# Patient Record
Sex: FEMALE | Race: WHITE | NOT HISPANIC OR LATINO | Employment: STUDENT | URBAN - NONMETROPOLITAN AREA
[De-identification: names, ages, dates, MRNs, and addresses within clinical notes are randomized per-mention and may not be internally consistent; named-entity substitution may affect disease eponyms.]

---
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
## 2023-07-05 ENCOUNTER — OFFICE VISIT (OUTPATIENT)
Dept: FAMILY MEDICINE CLINIC | Facility: CLINIC | Age: 18
End: 2023-07-05
Payer: COMMERCIAL

## 2023-07-05 VITALS
BODY MASS INDEX: 25.24 KG/M2 | RESPIRATION RATE: 18 BRPM | OXYGEN SATURATION: 99 % | TEMPERATURE: 98.7 F | HEART RATE: 76 BPM | DIASTOLIC BLOOD PRESSURE: 64 MMHG | HEIGHT: 57 IN | WEIGHT: 117 LBS | SYSTOLIC BLOOD PRESSURE: 119 MMHG

## 2023-07-05 DIAGNOSIS — F41.1 GAD (GENERALIZED ANXIETY DISORDER): ICD-10-CM

## 2023-07-05 DIAGNOSIS — G43.009 MIGRAINE WITHOUT AURA AND WITHOUT STATUS MIGRAINOSUS, NOT INTRACTABLE: ICD-10-CM

## 2023-07-05 DIAGNOSIS — E55.9 VITAMIN D DEFICIENCY: ICD-10-CM

## 2023-07-05 DIAGNOSIS — T75.3XXS MOTION SICKNESS, SEQUELA: ICD-10-CM

## 2023-07-05 DIAGNOSIS — Z13.29 THYROID DISORDER SCREEN: ICD-10-CM

## 2023-07-05 DIAGNOSIS — F41.1 GAD (GENERALIZED ANXIETY DISORDER): Primary | ICD-10-CM

## 2023-07-05 PROBLEM — T75.3XXA MOTION SICKNESS: Status: ACTIVE | Noted: 2023-07-05

## 2023-07-05 LAB
25(OH)D3 SERPL-MCNC: 29.4 NG/ML (ref 30–100)
ALBUMIN SERPL BCP-MCNC: 3.7 G/DL (ref 3.5–5)
ALP SERPL-CCNC: 76 U/L (ref 46–384)
ALT SERPL W P-5'-P-CCNC: 15 U/L (ref 12–78)
ANION GAP SERPL CALCULATED.3IONS-SCNC: 6 MMOL/L
AST SERPL W P-5'-P-CCNC: 13 U/L (ref 5–45)
BASOPHILS # BLD AUTO: 0.04 THOUSANDS/ÂΜL (ref 0–0.1)
BASOPHILS NFR BLD AUTO: 1 % (ref 0–1)
BILIRUB SERPL-MCNC: 0.43 MG/DL (ref 0.2–1)
BUN SERPL-MCNC: 9 MG/DL (ref 5–25)
CALCIUM SERPL-MCNC: 9.1 MG/DL (ref 8.3–10.1)
CHLORIDE SERPL-SCNC: 110 MMOL/L (ref 96–108)
CO2 SERPL-SCNC: 24 MMOL/L (ref 21–32)
CREAT SERPL-MCNC: 0.8 MG/DL (ref 0.6–1.3)
EOSINOPHIL # BLD AUTO: 0.12 THOUSAND/ÂΜL (ref 0–0.61)
EOSINOPHIL NFR BLD AUTO: 2 % (ref 0–6)
ERYTHROCYTE [DISTWIDTH] IN BLOOD BY AUTOMATED COUNT: 13.2 % (ref 11.6–15.1)
GFR SERPL CREATININE-BSD FRML MDRD: 107 ML/MIN/1.73SQ M
GLUCOSE SERPL-MCNC: 73 MG/DL (ref 65–140)
HCT VFR BLD AUTO: 42.6 % (ref 34.8–46.1)
HGB BLD-MCNC: 13.5 G/DL (ref 11.5–15.4)
IMM GRANULOCYTES # BLD AUTO: 0.01 THOUSAND/UL (ref 0–0.2)
IMM GRANULOCYTES NFR BLD AUTO: 0 % (ref 0–2)
LYMPHOCYTES # BLD AUTO: 1.9 THOUSANDS/ÂΜL (ref 0.6–4.47)
LYMPHOCYTES NFR BLD AUTO: 27 % (ref 14–44)
MCH RBC QN AUTO: 27.8 PG (ref 26.8–34.3)
MCHC RBC AUTO-ENTMCNC: 31.7 G/DL (ref 31.4–37.4)
MCV RBC AUTO: 88 FL (ref 82–98)
MONOCYTES # BLD AUTO: 0.78 THOUSAND/ÂΜL (ref 0.17–1.22)
MONOCYTES NFR BLD AUTO: 11 % (ref 4–12)
NEUTROPHILS # BLD AUTO: 4.11 THOUSANDS/ÂΜL (ref 1.85–7.62)
NEUTS SEG NFR BLD AUTO: 59 % (ref 43–75)
NRBC BLD AUTO-RTO: 0 /100 WBCS
PLATELET # BLD AUTO: 301 THOUSANDS/UL (ref 149–390)
PMV BLD AUTO: 11.9 FL (ref 8.9–12.7)
POTASSIUM SERPL-SCNC: 3.9 MMOL/L (ref 3.5–5.3)
PROT SERPL-MCNC: 7.2 G/DL (ref 6.4–8.4)
RBC # BLD AUTO: 4.85 MILLION/UL (ref 3.81–5.12)
SODIUM SERPL-SCNC: 140 MMOL/L (ref 135–147)
TSH SERPL DL<=0.05 MIU/L-ACNC: 0.61 UIU/ML (ref 0.45–4.5)
WBC # BLD AUTO: 6.96 THOUSAND/UL (ref 4.31–10.16)

## 2023-07-05 PROCEDURE — 85025 COMPLETE CBC W/AUTO DIFF WBC: CPT | Performed by: NURSE PRACTITIONER

## 2023-07-05 PROCEDURE — 82306 VITAMIN D 25 HYDROXY: CPT | Performed by: NURSE PRACTITIONER

## 2023-07-05 PROCEDURE — 80053 COMPREHEN METABOLIC PANEL: CPT | Performed by: NURSE PRACTITIONER

## 2023-07-05 PROCEDURE — T1015 CLINIC SERVICE: HCPCS | Performed by: FAMILY MEDICINE

## 2023-07-05 PROCEDURE — 84443 ASSAY THYROID STIM HORMONE: CPT | Performed by: NURSE PRACTITIONER

## 2023-07-05 RX ORDER — SERTRALINE HYDROCHLORIDE 25 MG/1
25 TABLET, FILM COATED ORAL DAILY
Qty: 90 TABLET | Refills: 1 | Status: SHIPPED | OUTPATIENT
Start: 2023-07-05

## 2023-07-05 RX ORDER — SUMATRIPTAN 25 MG/1
25 TABLET, FILM COATED ORAL ONCE AS NEEDED
Qty: 10 TABLET | Refills: 0 | Status: SHIPPED | OUTPATIENT
Start: 2023-07-05

## 2023-07-05 RX ORDER — MECLIZINE HYDROCHLORIDE 25 MG/1
25 TABLET ORAL EVERY 12 HOURS PRN
Qty: 30 TABLET | Refills: 0 | Status: SHIPPED | OUTPATIENT
Start: 2023-07-05

## 2023-07-05 NOTE — PROGRESS NOTES
Assessment/Plan:     Diagnoses and all orders for this visit:    RIMA (generalized anxiety disorder)  Comments:  RIMA-7 is 11 down from 20  Continue sertraline 25 mg daily  Declined  therapy  RTC 2 months  Orders:  -     sertraline (Zoloft) 25 mg tablet; Take 1 tablet (25 mg total) by mouth daily    Migraine without aura and without status migrainosus, not intractable  Comments:  Monthly; independent of menses  Start imitrex as needed  RTC if they occur more frequently  Orders:  -     SUMAtriptan (Imitrex) 25 mg tablet; Take 1 tablet (25 mg total) by mouth once as needed for migraine    Motion sickness, sequela  Comments:  Meclizine 1 hour prior to travel  RTC if no improvement  Orders:  -     meclizine (ANTIVERT) 25 mg tablet; Take 1 tablet (25 mg total) by mouth every 12 (twelve) hours as needed for dizziness or nausea            Return in about 4 weeks (around 8/2/2023) for Annual physical.       Subjective:        Patient ID: Amanda Horan is a 21 y.o. female. Chief Complaint   Patient presents with   • Follow-up     Anxiety med       PMH reflux and anxiety presents for anxiety follow up. Reports improvement in anxiety with sertraline use. Endorses family drama but does not want to pursue [id number] therapy at this time. Denies SI or HI. Is going to start pursuing permit for 's license. Also endorses motion sickness on longer car rides and migraine headaches without aura that occur about once a month. They do not correlate to her menses. Ibuprofen and acetaminophen have not been completely effective. Anxiety  Presents for follow-up visit. Symptoms include dizziness, excessive worry (rare) and nervous/anxious behavior (rare).  Patient reports no chest pain, compulsions, confusion, decreased concentration, depressed mood (slightly less), dry mouth, feeling of choking, hyperventilation, impotence, insomnia, irritability, malaise, muscle tension, nausea, obsessions, palpitations, panic, restlessness, shortness of breath or suicidal ideas. Symptoms occur rarely. The severity of symptoms is moderate. The patient sleeps 8 hours per night. The quality of sleep is good. Nighttime awakenings: occasional.     Compliance with medications is %. The following portions of the patient's history were reviewed and updated as appropriate: allergies, current medications, past family history, past medical history, past social history, past surgical history and problem list.    Patient Active Problem List   Diagnosis   • RIMA (generalized anxiety disorder)   • Motion sickness   • Migraine without aura and without status migrainosus, not intractable       Current Outpatient Medications   Medication Sig Dispense Refill   • levonorgestrel-ethinyl estradiol (Vienva) 0.1-20 MG-MCG per tablet Take 1 tablet by mouth daily     • meclizine (ANTIVERT) 25 mg tablet Take 1 tablet (25 mg total) by mouth every 12 (twelve) hours as needed for dizziness or nausea 30 tablet 0   • sertraline (Zoloft) 25 mg tablet Take 1 tablet (25 mg total) by mouth daily 90 tablet 1   • SUMAtriptan (Imitrex) 25 mg tablet Take 1 tablet (25 mg total) by mouth once as needed for migraine 10 tablet 0     No current facility-administered medications for this visit. Past Medical History:   Diagnosis Date   • GERD (gastroesophageal reflux disease)         History reviewed. No pertinent surgical history.      Social History     Socioeconomic History   • Marital status: Single     Spouse name: Not on file   • Number of children: Not on file   • Years of education: Not on file   • Highest education level: Not on file   Occupational History   • Not on file   Tobacco Use   • Smoking status: Never   • Smokeless tobacco: Never   Vaping Use   • Vaping Use: Never used   Substance and Sexual Activity   • Alcohol use: Never   • Drug use: Never   • Sexual activity: Never   Other Topics Concern   • Not on file   Social History Narrative    -gets bullied at school talk to her parents and the school    -nothing has been done at school    -talking behind her back; yelling at her getting gin her face    -parents have gone to the school    -Mondays she goes to [address], stress relief;    -avoids going out of the house because of bullies    -Has friends at school    home: parents 2 sisters 1 brother    Pets: 3 dogs 2 cats    Smoking: no smoking    Education: going into [employer]    Activity: D [employer]     Social Determinants of Health     Financial Resource Strain: Not on file   Food Insecurity: Not on file   Transportation Needs: Not on file   Physical Activity: Not on file   Stress: Not on file   Social Connections: Not on file   Intimate Partner Violence: Not on file   Housing Stability: Not on file      . Review of Systems   Constitutional: Negative for activity change, appetite change, fatigue, irritability and unexpected weight change. Respiratory: Negative for cough, shortness of breath and wheezing. Cardiovascular: Negative for chest pain, palpitations and leg swelling. Gastrointestinal: Negative for abdominal pain, blood in stool, constipation, diarrhea and nausea. Genitourinary: Negative for difficulty urinating, dysuria, frequency, hematuria, impotence and urgency. Musculoskeletal: Negative for arthralgias, back pain and myalgias. Skin: Negative for color change and rash. Neurological: Positive for dizziness and headaches. Negative for tremors, syncope and weakness. Psychiatric/Behavioral: Negative for agitation, confusion, decreased concentration, self-injury and suicidal ideas. The patient is nervous/anxious (rare). The patient does not have insomnia. Objective:      /64   Pulse 76   Temp 98.7 °F (37.1 °C)   Resp 18   Ht 4' 9" (1.448 m)   Wt 53.1 kg (117 lb)   SpO2 99%   BMI 25.32 kg/m²          Physical Exam  Vitals and nursing note reviewed. Constitutional:       Appearance: Normal appearance.    HENT:      Head: Normocephalic and atraumatic. Nose: Nose normal.      Mouth/Throat:      Mouth: Mucous membranes are moist.   Eyes:      Conjunctiva/sclera: Conjunctivae normal.   Cardiovascular:      Rate and Rhythm: Normal rate and regular rhythm. Pulmonary:      Effort: Pulmonary effort is normal.      Breath sounds: Normal breath sounds. Abdominal:      General: Abdomen is flat. Bowel sounds are normal.      Palpations: Abdomen is soft. Genitourinary:     Comments: Exam deferred  Skin:     General: Skin is warm and dry. Capillary Refill: Capillary refill takes less than 2 seconds. Neurological:      General: No focal deficit present. Mental Status: She is alert and oriented to person, place, and time. Psychiatric:         Mood and Affect: Mood is anxious. Speech: Speech is rapid and pressured. Behavior: Behavior normal.         Thought Content: Thought content does not include homicidal or suicidal ideation.

## 2023-07-06 ENCOUNTER — TELEPHONE (OUTPATIENT)
Dept: FAMILY MEDICINE CLINIC | Facility: CLINIC | Age: 18
End: 2023-07-06

## 2023-07-06 DIAGNOSIS — E55.9 VITAMIN D DEFICIENCY: Primary | ICD-10-CM

## 2023-07-06 RX ORDER — MELATONIN
2000 DAILY
Qty: 180 TABLET | Refills: 1 | Status: SHIPPED | OUTPATIENT
Start: 2023-07-06 | End: 2023-08-11 | Stop reason: SDUPTHER

## 2023-07-06 NOTE — TELEPHONE ENCOUNTER
----- Message from [address] sent at 7/6/2023 10:27 AM EDT -----  Please let patient know vitamin D level is low. I sent in 2000 IU vitamin D for her to take daily. Thank you.

## 2023-08-11 ENCOUNTER — OFFICE VISIT (OUTPATIENT)
Dept: FAMILY MEDICINE CLINIC | Facility: CLINIC | Age: 18
End: 2023-08-11
Payer: COMMERCIAL

## 2023-08-11 VITALS
WEIGHT: 120.1 LBS | TEMPERATURE: 98 F | BODY MASS INDEX: 25.91 KG/M2 | HEART RATE: 96 BPM | HEIGHT: 57 IN | SYSTOLIC BLOOD PRESSURE: 116 MMHG | RESPIRATION RATE: 18 BRPM | DIASTOLIC BLOOD PRESSURE: 72 MMHG | OXYGEN SATURATION: 98 %

## 2023-08-11 DIAGNOSIS — K21.9 GASTROESOPHAGEAL REFLUX DISEASE, UNSPECIFIED WHETHER ESOPHAGITIS PRESENT: ICD-10-CM

## 2023-08-11 DIAGNOSIS — G43.009 MIGRAINE WITHOUT AURA AND WITHOUT STATUS MIGRAINOSUS, NOT INTRACTABLE: ICD-10-CM

## 2023-08-11 DIAGNOSIS — E55.9 VITAMIN D DEFICIENCY: ICD-10-CM

## 2023-08-11 DIAGNOSIS — Z00.00 ANNUAL PHYSICAL EXAM: Primary | ICD-10-CM

## 2023-08-11 PROCEDURE — T1015 CLINIC SERVICE: HCPCS | Performed by: FAMILY MEDICINE

## 2023-08-11 RX ORDER — MELATONIN
2000 DAILY
Qty: 180 TABLET | Refills: 1 | Status: SHIPPED | OUTPATIENT
Start: 2023-08-11

## 2023-08-11 RX ORDER — FAMOTIDINE 20 MG/1
20 TABLET, FILM COATED ORAL 2 TIMES DAILY
Qty: 60 TABLET | Refills: 1 | Status: SHIPPED | OUTPATIENT
Start: 2023-08-11

## 2023-08-11 RX ORDER — SUMATRIPTAN 25 MG/1
25 TABLET, FILM COATED ORAL ONCE AS NEEDED
Qty: 10 TABLET | Refills: 0 | Status: SHIPPED | OUTPATIENT
Start: 2023-08-11

## 2023-08-11 NOTE — PROGRESS NOTES
ADULT ANNUAL PHYSICAL  [facility]    NAME: David Castro  AGE: 21 y.o. SEX: female  : 2005     DATE: 2023     Assessment and Plan:     Problem List Items Addressed This Visit        Cardiovascular and Mediastinum    Migraine without aura and without status migrainosus, not intractable    Relevant Medications    SUMAtriptan (Imitrex) 25 mg tablet   Other Visit Diagnoses     Annual physical exam    -  Primary    Gastroesophageal reflux disease, unspecified whether esophagitis present        Avoid offending foods  Start famotidine 20 mg BID  RTC 1 month for evaluation    Relevant Medications    famotidine (PEPCID) 20 mg tablet    Vitamin D deficiency        Relevant Medications    cholecalciferol (VITAMIN D3) 1,000 units tablet          Immunizations and preventive care screenings were discussed with patient today. Appropriate education was printed on patient's after visit summary. Counseling:  Alcohol/drug use: discussed moderation in alcohol intake, the recommendations for healthy alcohol use, and avoidance of illicit drug use. Dental Health: discussed importance of regular tooth brushing, flossing, and dental visits. Injury prevention: discussed safety/seat belts, safety helmets, smoke detectors, carbon dioxide detectors, and smoking near bedding or upholstery. Sexual health: discussed sexually transmitted diseases, partner selection, use of condoms, avoidance of unintended pregnancy, and contraceptive alternatives. · Exercise: the importance of regular exercise/physical activity was discussed. Recommend exercise 3-5 times per week for at least 30 minutes. BMI Counseling: Body mass index is 25.99 kg/m².  The BMI is above normal. Nutrition recommendations include decreasing portion sizes, encouraging healthy choices of fruits and vegetables, decreasing fast food intake, consuming healthier snacks, limiting drinks that contain sugar, moderation in carbohydrate intake, increasing intake of lean protein, reducing intake of saturated and trans fat and reducing intake of cholesterol. Exercise recommendations include exercising 3-5 times per week. No pharmacotherapy was ordered. Rationale for BMI follow-up plan is due to patient being overweight or obese. Return in about 4 weeks (around 9/8/2023) for Recheck GERD. Chief Complaint:     Chief Complaint   Patient presents with   • Physical Exam      History of Present Illness:     Adult Annual Physical   Patient here for a comprehensive physical exam. The patient reports no problems. Diet and Physical Activity  · Diet/Nutrition: well balanced diet, limited junk food and consuming 3-5 servings of fruits/vegetables daily. · Exercise: walking and 3-4 times a week on average. Depression Screening  PHQ-2/9 Depression Screening    Little interest or pleasure in doing things: 0 - not at all  Feeling down, depressed, or hopeless: 0 - not at all  PHQ-2 Score: 0  PHQ-2 Interpretation: Negative depression screen       General Health  · Sleep: sleeps well and gets 7-8 hours of sleep on average. · Hearing: normal - bilateral.  · Vision: no vision problems, most recent eye exam >1 year ago and wears glasses. · Dental: regular dental visits, brushes teeth twice daily and flosses teeth occasionally. /GYN Health  · Last menstrual period: July 2023  · Contraceptive method: oral contraceptives. · History of STDs?: no.     Review of Systems:     Review of Systems   Constitutional: Negative for activity change, appetite change and fatigue. Respiratory: Negative for cough and shortness of breath. Cardiovascular: Negative for chest pain and leg swelling. Gastrointestinal: Negative for abdominal pain, blood in stool, constipation, diarrhea and nausea. Heartburn   Genitourinary: Negative for difficulty urinating, dysuria, hematuria and menstrual problem. Musculoskeletal: Negative for arthralgias and myalgias. Skin: Negative for rash. Neurological: Negative for dizziness, weakness and headaches. Psychiatric/Behavioral: Negative for agitation, confusion and sleep disturbance. Past Medical History:     Past Medical History:   Diagnosis Date   • GERD (gastroesophageal reflux disease)       Past Surgical History:     History reviewed. No pertinent surgical history.    Social History:     Social History     Socioeconomic History   • Marital status: Single     Spouse name: None   • Number of children: None   • Years of education: None   • Highest education level: None   Occupational History   • None   Tobacco Use   • Smoking status: Never   • Smokeless tobacco: Never   Vaping Use   • Vaping Use: Never used   Substance and Sexual Activity   • Alcohol use: Never   • Drug use: Never   • Sexual activity: Never   Other Topics Concern   • None   Social History Narrative    -gets bullied at school talk to her parents and the school    -nothing has been done at school    -talking behind her back; yelling at her getting gin her face    -parents have gone to the school    -Mondays she goes to [employer], stress relief;    -avoids going out of the house because of bullies    -Has friends at school    home: parents 2 sisters 1 brother    Pets: 3 dogs 2 cats    Smoking: no smoking    Education: going into [employer]    Activity: D & D     Social Determinants of Health     Financial Resource Strain: Not on file   Food Insecurity: Not on file   Transportation Needs: Not on file   Physical Activity: Not on file   Stress: Not on file   Social Connections: Not on file   Intimate Partner Violence: Not on file   Housing Stability: Not on file      Family History:     Family History   Problem Relation Age of Onset   • Seizures Mother    • Diabetes Father    • Congenital heart disease Father    • Seizures Sister    • Cholecystitis Sister    • ADD / ADHD Brother    • Cervical cancer Maternal Grandmother    • Stroke Maternal Grandfather    • Cancer Paternal Grandmother    • Lung cancer Paternal Grandfather       Current Medications:     Current Outpatient Medications   Medication Sig Dispense Refill   • cholecalciferol (VITAMIN D3) 1,000 units tablet Take 2 tablets (2,000 Units total) by mouth daily 180 tablet 1   • famotidine (PEPCID) 20 mg tablet Take 1 tablet (20 mg total) by mouth 2 (two) times a day 60 tablet 1   • meclizine (ANTIVERT) 25 mg tablet Take 1 tablet (25 mg total) by mouth every 12 (twelve) hours as needed for dizziness or nausea 30 tablet 0   • sertraline (Zoloft) 25 mg tablet Take 1 tablet (25 mg total) by mouth daily 90 tablet 1   • SUMAtriptan (Imitrex) 25 mg tablet Take 1 tablet (25 mg total) by mouth once as needed for migraine 10 tablet 0   • levonorgestrel-ethinyl estradiol (Vienva) 0.1-20 MG-MCG per tablet Take 1 tablet by mouth daily (Patient not taking: Reported on 8/11/2023)       No current facility-administered medications for this visit. Allergies: Allergies   Allergen Reactions   • Ultraswim Chlorine Removal [Urea] Rash      Physical Exam:     /72 (BP Location: Left arm, Patient Position: Sitting, Cuff Size: Standard)   Pulse 96   Temp 98 °F (36.7 °C) (Tympanic)   Resp 18   Ht 4' 9" (1.448 m)   Wt 54.5 kg (120 lb 1.6 oz)   SpO2 98%   BMI 25.99 kg/m²     Physical Exam  Vitals and nursing note reviewed. Constitutional:       General: She is not in acute distress. Appearance: She is well-developed. HENT:      Head: Normocephalic and atraumatic. Right Ear: Tympanic membrane, ear canal and external ear normal.      Left Ear: Tympanic membrane, ear canal and external ear normal.      Nose: Nose normal.      Mouth/Throat:      Mouth: Mucous membranes are moist.      Pharynx: Oropharynx is clear. Eyes:      Conjunctiva/sclera: Conjunctivae normal.   Cardiovascular:      Rate and Rhythm: Normal rate and regular rhythm.       Heart sounds: No murmur heard. Pulmonary:      Effort: Pulmonary effort is normal. No respiratory distress. Breath sounds: Normal breath sounds. Abdominal:      Palpations: Abdomen is soft. Tenderness: There is no abdominal tenderness. Genitourinary:     Comments: Exam deferred  Musculoskeletal:         General: No swelling. Skin:     General: Skin is warm and dry. Capillary Refill: Capillary refill takes less than 2 seconds. Neurological:      Mental Status: She is alert and oriented to person, place, and time.    Psychiatric:         Mood and Affect: Mood normal.          [address]ey [address], [address]

## 2023-11-29 ENCOUNTER — VBI (OUTPATIENT)
Dept: ADMINISTRATIVE | Facility: OTHER | Age: 18
End: 2023-11-29

## 2024-01-08 ENCOUNTER — TELEMEDICINE (OUTPATIENT)
Dept: FAMILY MEDICINE CLINIC | Facility: CLINIC | Age: 19
End: 2024-01-08
Payer: COMMERCIAL

## 2024-01-08 DIAGNOSIS — F41.1 GAD (GENERALIZED ANXIETY DISORDER): ICD-10-CM

## 2024-01-08 DIAGNOSIS — K21.9 GASTROESOPHAGEAL REFLUX DISEASE, UNSPECIFIED WHETHER ESOPHAGITIS PRESENT: Primary | ICD-10-CM

## 2024-01-08 PROCEDURE — 99213 OFFICE O/P EST LOW 20 MIN: CPT | Performed by: NURSE PRACTITIONER

## 2024-01-08 RX ORDER — OMEPRAZOLE 20 MG/1
20 CAPSULE, DELAYED RELEASE ORAL DAILY PRN
Qty: 90 CAPSULE | Refills: 0 | Status: SHIPPED | OUTPATIENT
Start: 2024-01-08

## 2024-01-08 NOTE — PROGRESS NOTES
Assessment/Plan:     Diagnoses and all orders for this visit:    Gastroesophageal reflux disease, unspecified whether esophagitis present  Comments:  Stop famotidine  Take omeprazole PRN prior to eating or drinking exacerbating foods/drinks  Alarm findings for RTC reviewed  Orders:  -     omeprazole (PriLOSEC) 20 mg delayed release capsule; Take 1 capsule (20 mg total) by mouth daily as needed (Reflux)    RIMA (generalized anxiety disorder)  Comments:  RIMA-7 is stable at 11  Increase sertraline to 50 mg daily  Declined BH therapy  RTC 6 weeks  Orders:  -     sertraline (Zoloft) 50 mg tablet; Take 1 tablet (50 mg total) by mouth daily            Return in about 6 weeks (around 2/19/2024) for Recheck anxiety and GERD.       Subjective:        Patient ID: Mahnaz Jackman is a 18 y.o. female.    Chief Complaint   Patient presents with    Medication Refill       PMH RIMA, migraine, vitamin D deficiency, and motion sickness presents for virtual visit for GERD and anxiety recheck. Patient was started on famotidine for reflux symptoms and sertraline for anxiety. She presents today for a virtual visit to review effectiveness of same. States famotidine caused worsening of her reflux. Notes improved symptoms with changes in food and drink. Endorses intermittent reflux without alarm findings. RIMA-7 shows moderate anxiety. Denies SI or HI.    Anxiety  Presents for follow-up visit. Symptoms include excessive worry, irritability, nervous/anxious behavior, restlessness and shortness of breath (when running). Patient reports no chest pain, compulsions, confusion, decreased concentration, depressed mood, dizziness, dry mouth, feeling of choking, hyperventilation, impotence, insomnia, malaise, muscle tension, nausea, obsessions, palpitations, panic or suicidal ideas. The severity of symptoms is moderate and interfering with daily activities. The patient sleeps 6 hours per night. The quality of sleep is poor. Nighttime awakenings: one  to two, several.     Compliance with medications is %.       The following portions of the patient's history were reviewed and updated as appropriate: allergies, current medications, past family history, past medical history, past social history, past surgical history and problem list.    Patient Active Problem List   Diagnosis    RIMA (generalized anxiety disorder)    Motion sickness    Migraine without aura and without status migrainosus, not intractable    Gastroesophageal reflux disease       Current Outpatient Medications   Medication Sig Dispense Refill    omeprazole (PriLOSEC) 20 mg delayed release capsule Take 1 capsule (20 mg total) by mouth daily as needed (Reflux) 90 capsule 0    sertraline (Zoloft) 50 mg tablet Take 1 tablet (50 mg total) by mouth daily 90 tablet 1    cholecalciferol (VITAMIN D3) 1,000 units tablet Take 2 tablets (2,000 Units total) by mouth daily 180 tablet 1    meclizine (ANTIVERT) 25 mg tablet Take 1 tablet (25 mg total) by mouth every 12 (twelve) hours as needed for dizziness or nausea 30 tablet 0    SUMAtriptan (Imitrex) 25 mg tablet Take 1 tablet (25 mg total) by mouth once as needed for migraine 10 tablet 0     No current facility-administered medications for this visit.        Past Medical History:   Diagnosis Date    GERD (gastroesophageal reflux disease)         History reviewed. No pertinent surgical history.     Social History     Socioeconomic History    Marital status: Single     Spouse name: Not on file    Number of children: Not on file    Years of education: Not on file    Highest education level: Not on file   Occupational History    Not on file   Tobacco Use    Smoking status: Never    Smokeless tobacco: Never   Vaping Use    Vaping status: Never Used   Substance and Sexual Activity    Alcohol use: Never    Drug use: Never    Sexual activity: Never   Other Topics Concern    Not on file   Social History Narrative    -gets bullied at school talk to her parents and  the school    -nothing has been done at school    -talking behind her back; yelling at her getting gin her face    -parents have gone to the school    -Mondays she goes to [employer], stress relief;    -avoids going out of the house because of bullies    -Has friends at school    home: parents 2 sisters 1 brother    Pets: 3 dogs 2 cats    Smoking: no smoking    Education: going into [employer]    Activity: D & D     Social Determinants of Health     Financial Resource Strain: Not on file   Food Insecurity: Not on file   Transportation Needs: Not on file   Physical Activity: Not on file   Stress: Not on file   Social Connections: Not on file   Intimate Partner Violence: Not on file   Housing Stability: Not on file      .  Review of Systems   Constitutional:  Positive for irritability. Negative for activity change, appetite change and fatigue.   HENT:  Negative for congestion, rhinorrhea, sore throat, trouble swallowing and voice change.    Respiratory:  Positive for chest tightness (when running) and shortness of breath (when running). Negative for cough and wheezing.    Cardiovascular:  Negative for chest pain and palpitations.   Gastrointestinal:  Negative for abdominal pain, constipation and nausea.        Intermittent heartburn   Genitourinary:  Negative for difficulty urinating, dysuria and impotence.   Musculoskeletal:  Negative for arthralgias and myalgias.   Neurological:  Negative for dizziness, weakness and headaches.   Psychiatric/Behavioral:  Negative for confusion, decreased concentration, self-injury, sleep disturbance and suicidal ideas. The patient is nervous/anxious. The patient does not have insomnia.          Objective:      There were no vitals taken for this visit.         Physical Exam  Vitals reviewed: Patient phone camera not working during virtual visit.   Pulmonary:      Comments: No conversational dyspnea  Neurological:      Mental Status: She is oriented to person, place, and time.   Psychiatric:          Mood and Affect: Mood is anxious.         Thought Content: Thought content does not include homicidal or suicidal ideation. Thought content does not include homicidal or suicidal plan.           Telemedicine consent    Patient: Mahnaz Jackman  Provider: LOLITA Turk  Provider located at [address] [id number]    The patient was identified by name and date of birth. Mahnaz Jackman was informed that this is a telemedicine visit and that the visit is being conducted through the Epic Embedded platform. She agrees to proceed..  My office door was closed. Other methods to assure confidentiality were taken. No one else was in the room.  She acknowledged consent and understanding of privacy and security of the video platform. The patient has agreed to participate and understands they can discontinue the visit at any time.    Patient is aware this is a billable service.     I spent 15 minutes with the patient during this visit.

## 2024-07-19 DIAGNOSIS — F41.1 GAD (GENERALIZED ANXIETY DISORDER): ICD-10-CM

## 2024-07-22 ENCOUNTER — TELEPHONE (OUTPATIENT)
Dept: FAMILY MEDICINE CLINIC | Facility: CLINIC | Age: 19
End: 2024-07-22

## 2024-12-17 ENCOUNTER — APPOINTMENT (OUTPATIENT)
Dept: URGENT CARE | Facility: MEDICAL CENTER | Age: 19
End: 2024-12-17

## 2024-12-18 ENCOUNTER — APPOINTMENT (OUTPATIENT)
Dept: URGENT CARE | Facility: MEDICAL CENTER | Age: 19
End: 2024-12-18
Payer: OTHER MISCELLANEOUS

## 2024-12-18 PROCEDURE — 99283 EMERGENCY DEPT VISIT LOW MDM: CPT | Performed by: PHYSICIAN ASSISTANT

## 2024-12-18 PROCEDURE — G0382 LEV 3 HOSP TYPE B ED VISIT: HCPCS | Performed by: PHYSICIAN ASSISTANT

## 2025-05-08 ENCOUNTER — TELEPHONE (OUTPATIENT)
Dept: FAMILY MEDICINE CLINIC | Facility: CLINIC | Age: 20
End: 2025-05-08

## 2025-05-08 NOTE — TELEPHONE ENCOUNTER
Spoke with patient about scheduling annual. She declined but will call back when ready to schedule.

## 2025-06-17 ENCOUNTER — TELEPHONE (OUTPATIENT)
Dept: FAMILY MEDICINE CLINIC | Facility: HOME HEALTHCARE | Age: 20
End: 2025-06-17

## 2025-06-17 NOTE — TELEPHONE ENCOUNTER
Attempted to contact patient to schedule Annual Physical and phone number is not available. Unable to leave a message.